# Patient Record
Sex: FEMALE | Race: OTHER | HISPANIC OR LATINO | Employment: UNEMPLOYED | URBAN - METROPOLITAN AREA
[De-identification: names, ages, dates, MRNs, and addresses within clinical notes are randomized per-mention and may not be internally consistent; named-entity substitution may affect disease eponyms.]

---

## 2022-10-26 ENCOUNTER — OFFICE VISIT (OUTPATIENT)
Dept: FAMILY MEDICINE CLINIC | Facility: CLINIC | Age: 62
End: 2022-10-26

## 2022-10-26 VITALS
BODY MASS INDEX: 33.76 KG/M2 | HEART RATE: 84 BPM | TEMPERATURE: 97.7 F | WEIGHT: 178.8 LBS | SYSTOLIC BLOOD PRESSURE: 128 MMHG | HEIGHT: 61 IN | OXYGEN SATURATION: 98 % | DIASTOLIC BLOOD PRESSURE: 78 MMHG

## 2022-10-26 DIAGNOSIS — I10 HYPERTENSION, UNSPECIFIED TYPE: Primary | ICD-10-CM

## 2022-10-26 DIAGNOSIS — Z12.4 SCREENING FOR CERVICAL CANCER: ICD-10-CM

## 2022-10-26 DIAGNOSIS — Z13.29 SCREENING FOR THYROID DISORDER: ICD-10-CM

## 2022-10-26 DIAGNOSIS — Z13.1 SCREENING FOR DIABETES MELLITUS: ICD-10-CM

## 2022-10-26 DIAGNOSIS — Z11.59 NEED FOR HEPATITIS C SCREENING TEST: ICD-10-CM

## 2022-10-26 DIAGNOSIS — Z12.11 COLON CANCER SCREENING: ICD-10-CM

## 2022-10-26 DIAGNOSIS — Z12.39 ENCOUNTER FOR BREAST CANCER SCREENING USING NON-MAMMOGRAM MODALITY: ICD-10-CM

## 2022-10-26 DIAGNOSIS — Z11.4 SCREENING FOR HIV (HUMAN IMMUNODEFICIENCY VIRUS): ICD-10-CM

## 2022-10-26 DIAGNOSIS — Z13.220 SCREENING CHOLESTEROL LEVEL: ICD-10-CM

## 2022-10-26 DIAGNOSIS — R82.90 ABNORMAL URINE: ICD-10-CM

## 2022-10-26 DIAGNOSIS — Z12.31 ENCOUNTER FOR SCREENING MAMMOGRAM FOR BREAST CANCER: ICD-10-CM

## 2022-10-26 DIAGNOSIS — Z23 ENCOUNTER FOR IMMUNIZATION: ICD-10-CM

## 2022-10-26 RX ORDER — HYDROCHLOROTHIAZIDE 12.5 MG/1
12.5 TABLET ORAL DAILY
Qty: 30 TABLET | Refills: 5 | Status: SHIPPED | OUTPATIENT
Start: 2022-10-26

## 2022-10-26 RX ORDER — LOSARTAN POTASSIUM 50 MG/1
50 TABLET ORAL DAILY
COMMUNITY
End: 2022-10-26 | Stop reason: SDUPTHER

## 2022-10-26 RX ORDER — LOSARTAN POTASSIUM 50 MG/1
50 TABLET ORAL DAILY
Qty: 30 TABLET | Refills: 5 | Status: SHIPPED | OUTPATIENT
Start: 2022-10-26

## 2022-10-26 NOTE — PROGRESS NOTES
[address] [id number]    NAME: Sandar Roth  AGE: 62 y o  SEX: female  : 1960     DATE: 10/26/2022     Assessment and Plan:     Problem List Items Addressed This Visit        Cardiovascular and Mediastinum    Hypertension - Primary     BP stable continue Losartan and hydrochlorothiazide           Relevant Medications    hydrochlorothiazide (HYDRODIURIL) 12 5 mg tablet    losartan (COZAAR) 50 mg tablet    Other Relevant Orders    TSH, 3rd generation with Free T4 reflex    Comprehensive metabolic panel    CBC and differential       Other    BMI 34 0-34 9,adult      Other Visit Diagnoses     Need for hepatitis C screening test        Relevant Orders    Hepatitis C RNA, quantitative, PCR    Screening for HIV (human immunodeficiency virus)        Relevant Orders    HIV-1 RNA, quantitative, PCR    Encounter for immunization        Screening for cervical cancer        Encounter for screening mammogram for breast cancer        Colon cancer screening        Relevant Orders    Cologuard    Screening for thyroid disorder        Screening for diabetes mellitus        Relevant Orders    Comprehensive metabolic panel    Screening cholesterol level        Relevant Orders    Lipid panel    Abnormal urine        Relevant Orders    UA (URINE) with reflex to Scope    Encounter for breast cancer screening using non-mammogram modality        Relevant Orders    US breast screening bilateral complete (ABUS)          Immunizations and preventive care screenings were discussed with patient today  Appropriate education was printed on patient's after visit summary  Counseling:  Alcohol/drug use: discussed moderation in alcohol intake, the recommendations for healthy alcohol use, and avoidance of illicit drug use  Dental Health: discussed importance of regular tooth brushing, flossing, and dental visits    Injury prevention: discussed safety/seat belts, safety helmets, smoke detectors, carbon dioxide detectors, and smoking near bedding or upholstery  Sexual health: discussed sexually transmitted diseases, partner selection, use of condoms, avoidance of unintended pregnancy, and contraceptive alternatives  · Exercise: the importance of regular exercise/physical activity was discussed  Recommend exercise 3-5 times per week for at least 30 minutes  Depression Screening and Follow-up Plan: Patient was screened for depression during today's encounter  They screened negative with a PHQ-2 score of 0  No follow-ups on file  Chief Complaint:     Chief Complaint   Patient presents with   • Establish Care      History of Present Illness:     Adult Annual Physical   Patient here for a comprehensive physical exam  The patient reports no problems  Presents today as a new patient to our area  No records to review  Following with Cardiology- [facility]- hasnt been seen for more than a year for routine care  Left breast biopsy 10-11 years ago- had mammogram 1 or 2 times after that but refuses mammograms in the future due to pain  Only wants ultrasounds going forward  Cervical cancer test 9-10 years ago  Went through menopause at age 47years old  No breakthrough bleeding  Pain in back and under right rib about 1 month ago went to hospital- work up negative- pain has resolved  Diet and Physical Activity  · Diet/Nutrition: well balanced diet, consuming 3-5 servings of fruits/vegetables daily and home cooked meals, lean proteins, garden  · Exercise: no formal exercise  Depression Screening  PHQ-2/9 Depression Screening    Little interest or pleasure in doing things: 0 - not at all  Feeling down, depressed, or hopeless: 0 - not at all  PHQ-2 Score: 0  PHQ-2 Interpretation: Negative depression screen       General Health  · Sleep: sleeps well  · Hearing: normal - bilateral   · Vision: wears glasses  · Dental: regular dental visits  /GYN Health  · Patient is: postmenopausal  ·    Review of Systems:     Review of Systems   Constitutional: Negative  Negative for chills and fever  HENT: Negative  Negative for ear pain and sore throat  Eyes: Negative for pain and visual disturbance  Respiratory: Negative  Negative for cough and shortness of breath  Cardiovascular: Negative  Negative for chest pain and palpitations  Gastrointestinal: Negative  Negative for abdominal pain and vomiting  Genitourinary: Negative  Negative for dysuria and hematuria  Musculoskeletal: Negative for arthralgias (achey at times) and back pain  Skin: Negative  Negative for color change and rash  Neurological: Negative  Negative for seizures and syncope  Hematological: Negative  Psychiatric/Behavioral: Negative  All other systems reviewed and are negative       Past Medical History:     Past Medical History:   Diagnosis Date   • Hypertension       Past Surgical History:     Past Surgical History:   Procedure Laterality Date   • BACK SURGERY  09/2020   • OTHER SURGICAL HISTORY Left     left breast biopsy      Social History:     Social History     Socioeconomic History   • Marital status: /Civil Union     Spouse name: None   • Number of children: None   • Years of education: None   • Highest education level: None   Occupational History   • None   Tobacco Use   • Smoking status: Never Smoker   • Smokeless tobacco: Never Used   Substance and Sexual Activity   • Alcohol use: Never   • Drug use: Never   • Sexual activity: Yes   Other Topics Concern   • None   Social History Narrative   • None     Social Determinants of Health     Financial Resource Strain: Not on file   Food Insecurity: Not on file   Transportation Needs: Not on file   Physical Activity: Not on file   Stress: Not on file   Social Connections: Not on file   Intimate Partner Violence: Not on file   Housing Stability: Not on file      Family History:     Family History Problem Relation Age of Onset   • No Known Problems Mother    • Heart attack Father    • Stroke Brother    • No Known Problems Brother    • No Known Problems Brother    • No Known Problems Son    • No Known Problems Daughter    • No Known Problems Maternal Grandmother    • No Known Problems Maternal Grandfather    • Ovarian cancer Paternal Grandmother    • No Known Problems Paternal Grandfather       Current Medications:     Current Outpatient Medications   Medication Sig Dispense Refill   • hydrochlorothiazide (HYDRODIURIL) 12 5 mg tablet Take 1 tablet (12 5 mg total) by mouth daily 30 tablet 5   • losartan (COZAAR) 50 mg tablet Take 1 tablet (50 mg total) by mouth daily 30 tablet 5     No current facility-administered medications for this visit  Allergies:     No Known Allergies   Physical Exam:     /78   Pulse 84   Temp 97 7 °F (36 5 °C) (Tympanic)   Ht 5' 0 81" (1 545 m)   Wt 81 1 kg (178 lb 12 8 oz)   SpO2 98%   BMI 34 00 kg/m²     Physical Exam  Vitals and nursing note reviewed  Constitutional:       General: She is not in acute distress  Appearance: Normal appearance  She is well-developed  She is obese  HENT:      Head: Normocephalic and atraumatic  Right Ear: Tympanic membrane, ear canal and external ear normal       Left Ear: Tympanic membrane, ear canal and external ear normal       Nose: Nose normal       Mouth/Throat:      Mouth: Mucous membranes are moist       Pharynx: Oropharynx is clear  Eyes:      Conjunctiva/sclera: Conjunctivae normal       Pupils: Pupils are equal, round, and reactive to light  Neck:      Thyroid: No thyromegaly or thyroid tenderness  Cardiovascular:      Rate and Rhythm: Normal rate and regular rhythm  Pulses:           Radial pulses are 2+ on the right side and 2+ on the left side  Heart sounds: No murmur heard  Pulmonary:      Effort: Pulmonary effort is normal  No respiratory distress  Breath sounds: Normal breath sounds  Abdominal:      General: Bowel sounds are normal       Palpations: Abdomen is soft  Tenderness: There is no abdominal tenderness  Musculoskeletal:      Cervical back: Neck supple  Right lower leg: No edema  Left lower leg: No edema  Lymphadenopathy:      Cervical: No cervical adenopathy  Skin:     General: Skin is warm and dry  Capillary Refill: Capillary refill takes less than 2 seconds  Neurological:      Mental Status: She is alert and oriented to person, place, and time  Psychiatric:         Mood and Affect: Mood normal          Behavior: Behavior normal          Thought Content:  Thought content normal          Judgment: Judgment normal           Joyce Rodrigez, [address] [facility]

## 2022-10-26 NOTE — PROGRESS NOTES
Name: Nataly Hicks      : 1960      MRN: [id number]  Encounter Provider: TERRI Arrington  Encounter Date: 10/26/2022   Encounter department: [facility]     1  Hypertension, unspecified type    2  Need for hepatitis C screening test    3  Screening for HIV (human immunodeficiency virus)    4  Encounter for immunization    5  Screening for cervical cancer    6  Encounter for screening mammogram for breast cancer    7  Colon cancer screening    8  BMI 34 0-34 9,adult    9  Screening for thyroid disorder    10  Screening for diabetes mellitus    11  Screening cholesterol level      BMI Counseling: Body mass index is 34 kg/m²  The BMI is above normal  Nutrition recommendations include decreasing portion sizes, encouraging healthy choices of fruits and vegetables, decreasing fast food intake, consuming healthier snacks, limiting drinks that contain sugar, moderation in carbohydrate intake, increasing intake of lean protein, reducing intake of saturated and trans fat and reducing intake of cholesterol  Exercise recommendations include exercising 3-5 times per week and strength training exercises  Rationale for BMI follow-up plan is due to patient being overweight or obese  Depression Screening and Follow-up Plan: Patient was screened for depression during today's encounter  They screened negative with a PHQ-2 score of 0  Subjective            Following with Cardiology- [facility]- hasnt been seen for more than a year for routine  Left breast biopsy 10-11 years ago- had mammogram 1 or 2 times after that but refuses mammograms in the future due to pain  Only wants ultrasounds going forward  Cervical cancer test 9-10 years ago  Went through menopause at age 47years old  No breakthrough bleeding  Pain in back and under right rib about 1 month ago went to hospital- work up negative- pain has resolved          Review of Systems Constitutional: Negative  HENT: Negative  Eyes: Negative  Wears glasses   Respiratory: Negative  Cardiovascular: Negative  Gastrointestinal: Negative  Genitourinary: Negative  Musculoskeletal: Positive for arthralgias (achey at times)  Neurological: Negative  Hematological: Negative  Psychiatric/Behavioral: Negative          Current Outpatient Medications on File Prior to Visit   Medication Sig   • losartan (COZAAR) 50 mg tablet Take 50 mg by mouth daily       Objective     /80   Pulse 84   Temp 97 7 °F (36 5 °C) (Tympanic)   Ht 5' 0 81" (1 545 m)   Wt 81 1 kg (178 lb 12 8 oz)   SpO2 98%   BMI 34 00 kg/m²     Physical Exam  [place] , CRNP

## 2022-10-26 NOTE — PATIENT INSTRUCTIONS
Complete fasting labs  Complete cologuard test- kit will be mailed to your home  Schedule US breasts for screening for breast cancer  Continue current medication regimen  Vaccines declined

## 2023-10-26 ENCOUNTER — TELEPHONE (OUTPATIENT)
Dept: FAMILY MEDICINE CLINIC | Facility: CLINIC | Age: 63
End: 2023-10-26

## 2023-10-26 DIAGNOSIS — Z12.11 COLON CANCER SCREENING: Primary | ICD-10-CM

## 2023-10-26 NOTE — TELEPHONE ENCOUNTER
----- Message from Concetta Madrid, [address] [place] [address] sent at 10/26/2023  1:17 PM EDT -----  Regarding: FW: Cancellation of Order # [id number]  Please follow up with patient to see if she will complete cologuard. If yes please reorder  ----- Message -----  From: Francesco,Cologuard  Sent: 10/26/2023  10:22 AM EDT  To: TERRI Sandoval  Subject: Cancellation of Order # [id number]                Order number [id number] for the procedure COLOGUARD Karl Phillip   has been canceled by Franny Thompson [[id number]]. This procedure   was ordered by you on Oct 26, 2022 for the patient Jose Mckinnon   [[id number]]. The reason for cancellation was "Order ".